# Patient Record
Sex: MALE | Race: WHITE | NOT HISPANIC OR LATINO | Employment: FULL TIME | URBAN - METROPOLITAN AREA
[De-identification: names, ages, dates, MRNs, and addresses within clinical notes are randomized per-mention and may not be internally consistent; named-entity substitution may affect disease eponyms.]

---

## 2023-10-19 ENCOUNTER — OFFICE VISIT (OUTPATIENT)
Dept: FAMILY MEDICINE CLINIC | Facility: CLINIC | Age: 28
End: 2023-10-19
Payer: COMMERCIAL

## 2023-10-19 VITALS
HEIGHT: 72 IN | BODY MASS INDEX: 28.31 KG/M2 | HEART RATE: 94 BPM | OXYGEN SATURATION: 97 % | WEIGHT: 209 LBS | TEMPERATURE: 98.7 F | DIASTOLIC BLOOD PRESSURE: 78 MMHG | SYSTOLIC BLOOD PRESSURE: 115 MMHG

## 2023-10-19 DIAGNOSIS — Z00.00 ROUTINE GENERAL MEDICAL EXAMINATION AT A HEALTH CARE FACILITY: Primary | ICD-10-CM

## 2023-10-19 NOTE — PROGRESS NOTES
"Sabina Aguilera is a 27 y.o. male presents for   Chief Complaint   Patient presents with    Hypertension     In past HTN       Health Maintenance Due   Topic Date Due    BMI FOLLOWUP  Never done    COVID-19 Vaccine (1) Never done    INFLUENZA VACCINE  Never done    HEPATITIS C SCREENING  Never done    ANNUAL PHYSICAL  Never done       History of Present Illness   Pt present to establish care.  He reports a history of htn and was on medication in the past.  He states he was taken off of medication due to bp normalizing and has remained normal since last year.  He denies problems or conerns today.   Discussed importance of balanced diet and routine exercise.  Also discussed risk and benefits of current recommended vaccines.  Patient declined vaccines today.  Vitals:    10/19/23 1523   BP: 115/78   BP Location: Right arm   Patient Position: Sitting   Cuff Size: Adult   Pulse: 94   Temp: 98.7 °F (37.1 °C)   TempSrc: Tympanic   SpO2: 97%   Weight: 94.8 kg (209 lb)   Height: 182.9 cm (72.01\")     Body mass index is 28.34 kg/m².    No current outpatient medications on file prior to visit.     No current facility-administered medications on file prior to visit.       The following portions of the patient's history were reviewed and updated as appropriate: allergies, current medications, past family history, past medical history, past social history, past surgical history, and problem list.    Review of Systems    Objective   Physical Exam  Vitals and nursing note reviewed.   Constitutional:       Appearance: Normal appearance. He is well-developed.   HENT:      Head: Normocephalic and atraumatic.      Right Ear: Tympanic membrane, ear canal and external ear normal.      Left Ear: Tympanic membrane, ear canal and external ear normal.      Nose: Nose normal.   Eyes:      Extraocular Movements: Extraocular movements intact.      Conjunctiva/sclera: Conjunctivae normal.      Pupils: Pupils are equal, round, and " reactive to light.   Cardiovascular:      Rate and Rhythm: Normal rate and regular rhythm.      Heart sounds: Normal heart sounds.   Pulmonary:      Effort: Pulmonary effort is normal.      Breath sounds: Normal breath sounds.   Abdominal:      General: Bowel sounds are normal.      Palpations: Abdomen is soft.   Musculoskeletal:         General: Normal range of motion.      Cervical back: Normal range of motion and neck supple.   Skin:     General: Skin is warm and dry.   Neurological:      General: No focal deficit present.      Mental Status: He is alert and oriented to person, place, and time.   Psychiatric:         Mood and Affect: Mood normal.         Behavior: Behavior normal.       PHQ-9 Total Score:      Assessment & Plan   Diagnoses and all orders for this visit:    1. Routine general medical examination at a health care facility (Primary)  Comments:  We will request previous records and labs.        There are no Patient Instructions on file for this visit.

## 2023-11-01 ENCOUNTER — TELEPHONE (OUTPATIENT)
Dept: FAMILY MEDICINE CLINIC | Facility: CLINIC | Age: 28
End: 2023-11-01
Payer: COMMERCIAL

## 2023-11-01 NOTE — TELEPHONE ENCOUNTER
CALLED PATIENT TO RESCHEDULE APPT ON 10/21/2023  PATIENT DID NOT ANSWER, LEFT A VM WITH A CALL BACK NUMBER

## 2023-11-30 ENCOUNTER — OFFICE VISIT (OUTPATIENT)
Dept: FAMILY MEDICINE CLINIC | Facility: CLINIC | Age: 28
End: 2023-11-30
Payer: COMMERCIAL

## 2023-11-30 VITALS
DIASTOLIC BLOOD PRESSURE: 73 MMHG | SYSTOLIC BLOOD PRESSURE: 114 MMHG | WEIGHT: 205 LBS | HEIGHT: 72 IN | OXYGEN SATURATION: 97 % | HEART RATE: 92 BPM | BODY MASS INDEX: 27.77 KG/M2 | TEMPERATURE: 98 F

## 2023-11-30 DIAGNOSIS — M25.362 INSTABILITY OF LEFT KNEE JOINT: Primary | ICD-10-CM

## 2023-11-30 PROCEDURE — 99213 OFFICE O/P EST LOW 20 MIN: CPT | Performed by: NURSE PRACTITIONER

## 2023-11-30 NOTE — PROGRESS NOTES
"Sabina Aguilera is a 28 y.o. male presents for   Chief Complaint   Patient presents with    Knee Pain     Left side. Heat, pain and swelling. Says he knelt down and when he got up he heard a loud pop.       Health Maintenance Due   Topic Date Due    COVID-19 Vaccine (1) Never done    INFLUENZA VACCINE  Never done    HEPATITIS C SCREENING  Never done       History of Present Illness   Pt present with left knee pain.  He states yesterday he bent down to put a plug in a tire, and when he stood up he felt something pop in his left knee and intense pain in knee and thigh.  He states it has been painful to walk or do anything since that time.  He states at times, he feels like his knee will give out or fold backwards when trying to walk.  He states when he got out of bed this morning his knee gave out on him when he stood up.  He states it is painful with most movements.  He took aleve this morning and states it didn't provide significant relief. 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  He states walking is painful.  He states he wore a copper brace last night, but did not wear while working today. He is unsure if it helped.     Vitals:    11/30/23 1356   BP: 114/73   BP Location: Right arm   Patient Position: Sitting   Cuff Size: Large Adult   Pulse: 92   Temp: 98 °F (36.7 °C)   TempSrc: Tympanic   SpO2: 97%   Weight: 93 kg (205 lb)   Height: 182.9 cm (72.01\")     Body mass index is 27.8 kg/m².    No current outpatient medications on file prior to visit.     No current facility-administered medications on file prior to visit.       The following portions of the patient's history were reviewed and updated as appropriate: allergies, current medications, past family history, past medical history, past social history, past surgical history, and problem list.    Review of Systems   Musculoskeletal:         Left knee pain "       Objective   Physical Exam  Vitals and nursing note reviewed.   Constitutional:       Appearance: Normal appearance. He is well-developed.   HENT:      Head: Normocephalic and atraumatic.      Right Ear: External ear normal.      Left Ear: External ear normal.      Nose: Nose normal.   Eyes:      Extraocular Movements: Extraocular movements intact.      Pupils: Pupils are equal, round, and reactive to light.   Cardiovascular:      Rate and Rhythm: Normal rate.      Pulses: Normal pulses.   Pulmonary:      Effort: Pulmonary effort is normal.   Musculoskeletal:      Cervical back: Normal range of motion and neck supple.      Left knee: Swelling, effusion and crepitus present. Decreased range of motion. Tenderness present over the medial joint line and lateral joint line.      Instability Tests: Anterior drawer test positive. Medial Charo test positive.   Skin:     General: Skin is warm and dry.   Neurological:      General: No focal deficit present.      Mental Status: He is alert and oriented to person, place, and time.   Psychiatric:         Mood and Affect: Mood normal.         Behavior: Behavior normal.       PHQ-9 Total Score:      Assessment & Plan   Diagnoses and all orders for this visit:    1. Instability of left knee joint (Primary)  Comments:  instructed on RICE therapy and wrapped with ace in office- sx improved with ace wrap.  encouraged nsaids prn with food  Orders:  -     MRI Knee Left Without Contrast; Future  -     Ambulatory Referral to Orthopedic Surgery  -     XR Knee 3 View Left; Future        There are no Patient Instructions on file for this visit.       Answers submitted by the patient for this visit:  Primary Reason for Visit (Submitted on 11/30/2023)  What is the primary reason for your visit?: Lower Extremity Injury  Lower Extremity Injury Questionnaire (Submitted on 11/30/2023)  Chief Complaint: Lower extremity pain  Incident occurred: 12 to 24 hours ago  Incident location: at  home  Injury mechanism: unknown  Pain location: left knee  Pain quality: aching, burning, shooting, stabbing  Pain - numeric: 6/10  Pain course: intermittent  tingling: Yes  inability to bear weight: Yes  loss of motion: Yes  Foreign body present: no foreign bodies

## 2023-12-01 ENCOUNTER — HOSPITAL ENCOUNTER (OUTPATIENT)
Dept: GENERAL RADIOLOGY | Facility: HOSPITAL | Age: 28
Discharge: HOME | End: 2023-12-01
Admitting: NURSE PRACTITIONER
Payer: COMMERCIAL

## 2023-12-01 DIAGNOSIS — M25.362 INSTABILITY OF LEFT KNEE JOINT: ICD-10-CM

## 2023-12-01 PROCEDURE — 73562 X-RAY EXAM OF KNEE 3: CPT

## 2023-12-15 ENCOUNTER — HOSPITAL ENCOUNTER (OUTPATIENT)
Dept: MRI IMAGING | Facility: HOSPITAL | Age: 28
Discharge: HOME | End: 2023-12-15
Admitting: NURSE PRACTITIONER
Payer: COMMERCIAL

## 2023-12-15 DIAGNOSIS — M25.362 INSTABILITY OF LEFT KNEE JOINT: ICD-10-CM

## 2023-12-15 PROCEDURE — 73721 MRI JNT OF LWR EXTRE W/O DYE: CPT

## 2024-01-17 ENCOUNTER — TELEPHONE (OUTPATIENT)
Dept: FAMILY MEDICINE CLINIC | Facility: CLINIC | Age: 29
End: 2024-01-17
Payer: COMMERCIAL

## 2024-01-17 NOTE — TELEPHONE ENCOUNTER
Caller: Royce Aguilera    Relationship to patient: Self    Best call back number: [phone]    Patient is needing: PATIENT RETURNED CALL WITH LIST OF PROVIDERS, IS SENDING INFORMATION THROUGH MY CHART MESSAGING.

## 2024-01-17 NOTE — TELEPHONE ENCOUNTER
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

## 2024-08-29 ENCOUNTER — OFFICE VISIT (OUTPATIENT)
Dept: FAMILY MEDICINE CLINIC | Facility: CLINIC | Age: 29
End: 2024-08-29

## 2024-08-29 VITALS
TEMPERATURE: 100 F | OXYGEN SATURATION: 97 % | HEIGHT: 72 IN | DIASTOLIC BLOOD PRESSURE: 78 MMHG | BODY MASS INDEX: 28.31 KG/M2 | WEIGHT: 209 LBS | HEART RATE: 77 BPM | SYSTOLIC BLOOD PRESSURE: 120 MMHG

## 2024-08-29 DIAGNOSIS — R11.10 VOMITING, UNSPECIFIED VOMITING TYPE, UNSPECIFIED WHETHER NAUSEA PRESENT: Primary | ICD-10-CM

## 2024-08-29 DIAGNOSIS — R19.7 DIARRHEA, UNSPECIFIED TYPE: ICD-10-CM

## 2024-08-29 LAB
EXPIRATION DATE: NORMAL
FLUAV AG UPPER RESP QL IA.RAPID: NOT DETECTED
FLUBV AG UPPER RESP QL IA.RAPID: NOT DETECTED
INTERNAL CONTROL: NORMAL
Lab: NORMAL
SARS-COV-2 AG UPPER RESP QL IA.RAPID: NOT DETECTED

## 2024-08-29 RX ORDER — ONDANSETRON 4 MG/1
4 TABLET, ORALLY DISINTEGRATING ORAL EVERY 8 HOURS PRN
Qty: 30 TABLET | Refills: 0 | Status: SHIPPED | OUTPATIENT
Start: 2024-08-29

## 2024-08-29 NOTE — LETTER
August 29, 2024     Patient: Royce Aguilera   YOB: 1995   Date of Visit: 8/29/2024       To Whom It May Concern:    It is my medical opinion that Royce Aguilera may return to work when fever free.  He has been ill since Monday 8/26.         Sincerely,        VIANNEY Chavez    CC: No Recipients

## 2024-08-29 NOTE — PROGRESS NOTES
Sabina Aguilera is a 28 y.o. male presents for   Chief Complaint   Patient presents with    Diarrhea     For 2 days    Vomiting     For 4 days    Headache       Health Maintenance Due   Topic Date Due    COVID-19 Vaccine (1 - 2023-24 season) Never done    HEPATITIS C SCREENING  Never done    INFLUENZA VACCINE  08/01/2024       History of Present Illness  The patient is a 28-year-old male who presents for evaluation of diarrhea, headache, and vomiting.    He began feeling unwell on Sunday evening, skipping dinner and going to bed early. Despite waking up still feeling ill, he attempted to go to work on Monday but had to leave due to frequent restroom visits. His condition worsened on Tuesday, with the addition of a severe headache, vomiting, and diarrhea. He also experienced intense stomach pain, which he localized to his lower ribs. He was unable to work on Wednesday due to these symptoms. His temperature this morning was 99.9 degrees at home.    Today, he has not vomited but continues to experience diarrhea and a mild headache. He also reports a sporadic cough. He took a COVID-19 test at home two days ago and another one today in the office. He has been using the restroom approximately 12 times a day, with his stool varying from watery to brown and loose. He reports no mucus or blood in his stool. He recalls feeling unwell after eating yogurt for lunch on Sunday. He mentions that three family members have tested positive for COVID-19, but he has not been in contact with them for a month. No one else in his household is experiencing similar symptoms.    He has vomited 7 to 8 times since Monday morning, with the last episode occurring last night. He has not taken any over-the-counter medications for his symptoms, but did take Tylenol for his headache, which he subsequently vomited up two hours later. He has not taken any Imodium.    Vitals:    08/29/24 1316   BP: 120/78   BP Location: Left arm   Patient  "Position: Sitting   Cuff Size: Large Adult   Pulse: 77   Temp: 100 °F (37.8 °C)   TempSrc: Tympanic   SpO2: 97%   Weight: 94.8 kg (209 lb)   Height: 182.9 cm (72.01\")     Body mass index is 28.34 kg/m².    Current Outpatient Medications on File Prior to Visit   Medication Sig Dispense Refill    diclofenac (VOLTAREN) 75 MG EC tablet Take 1 tablet by mouth 2 (Two) Times a Day.       No current facility-administered medications on file prior to visit.       The following portions of the patient's history were reviewed and updated as appropriate: allergies, current medications, past family history, past medical history, past social history, past surgical history, and problem list.    Review of Systems   Gastrointestinal:  Positive for abdominal pain, diarrhea and vomiting.   Neurological:  Positive for headache.       Objective   Physical Exam  Vitals and nursing note reviewed.   Constitutional:       Appearance: Normal appearance. He is well-developed.   HENT:      Head: Normocephalic and atraumatic.      Right Ear: External ear normal.      Left Ear: External ear normal.      Nose: Nose normal.   Eyes:      Extraocular Movements: Extraocular movements intact.      Pupils: Pupils are equal, round, and reactive to light.   Cardiovascular:      Rate and Rhythm: Normal rate and regular rhythm.      Heart sounds: Normal heart sounds.   Pulmonary:      Effort: Pulmonary effort is normal.      Breath sounds: Normal breath sounds.   Abdominal:      General: Bowel sounds are normal.      Palpations: Abdomen is soft.      Tenderness: There is abdominal tenderness (epigastric).   Musculoskeletal:         General: Normal range of motion.      Cervical back: Normal range of motion and neck supple.   Skin:     General: Skin is warm and dry.   Neurological:      General: No focal deficit present.      Mental Status: He is alert and oriented to person, place, and time.   Psychiatric:         Mood and Affect: Mood normal.         " Behavior: Behavior normal.              PHQ-9 Total Score:      Results      Assessment & Plan   Diagnoses and all orders for this visit:    1. Vomiting, unspecified vomiting type, unspecified whether nausea present (Primary)  -     POCT SARS-CoV-2 + Flu Antigen CHARI  -     ondansetron ODT (ZOFRAN-ODT) 4 MG disintegrating tablet; Place 1 tablet on the tongue Every 8 (Eight) Hours As Needed for Nausea or Vomiting.  Dispense: 30 tablet; Refill: 0    2. Diarrhea, unspecified type         1. Diarrhea.  The patient reports experiencing diarrhea since Sunday evening, with up to 12 episodes per day. The stool varies from watery to brown and loose. He was advised to avoid dairy products and consume bland, low-fat foods such as chicken and mashed potatoes. The BRAT diet (bananas, rice, applesauce, and toast) was recommended to help slow down gut motility and bulk up the stool. Adequate hydration was emphasized. No blood or mucus was noted in the stool. If symptoms persist, further evaluation will be considered.    2. Headache.  The patient has been experiencing a headache since Sunday evening, which has improved but is still present. He took ibuprofen, but it is unclear if it was effective due to subsequent vomiting. He was advised to continue using over-the-counter pain relief as needed, ensuring adequate hydration.    3. Vomiting.  The patient experienced vomiting multiple times from Monday morning until last night. He has not vomited today. He was advised to maintain hydration and follow the BRAT diet to help settle his stomach. If vomiting recurs, further evaluation will be considered.        There are no Patient Instructions on file for this visit.         Patient or patient representative verbalized consent for the use of Ambient Listening during the visit with  VIANNEY Chavez for chart documentation. 8/29/2024  13:37 EDT

## 2024-09-10 ENCOUNTER — OFFICE VISIT (OUTPATIENT)
Dept: FAMILY MEDICINE CLINIC | Facility: CLINIC | Age: 29
End: 2024-09-10

## 2024-09-10 VITALS
OXYGEN SATURATION: 98 % | BODY MASS INDEX: 27.39 KG/M2 | HEIGHT: 72 IN | TEMPERATURE: 98.7 F | HEART RATE: 63 BPM | SYSTOLIC BLOOD PRESSURE: 121 MMHG | DIASTOLIC BLOOD PRESSURE: 79 MMHG | WEIGHT: 202.2 LBS

## 2024-09-10 DIAGNOSIS — J06.9 UPPER RESPIRATORY TRACT INFECTION, UNSPECIFIED TYPE: Primary | ICD-10-CM

## 2024-09-10 DIAGNOSIS — J98.8 CONGESTION OF RESPIRATORY TRACT: ICD-10-CM

## 2024-09-10 PROCEDURE — 87428 SARSCOV & INF VIR A&B AG IA: CPT | Performed by: NURSE PRACTITIONER

## 2024-09-10 PROCEDURE — 99213 OFFICE O/P EST LOW 20 MIN: CPT | Performed by: NURSE PRACTITIONER

## 2024-09-10 RX ORDER — BROMPHENIRAMINE MALEATE, PSEUDOEPHEDRINE HYDROCHLORIDE, AND DEXTROMETHORPHAN HYDROBROMIDE 2; 30; 10 MG/5ML; MG/5ML; MG/5ML
5 SYRUP ORAL 4 TIMES DAILY PRN
Qty: 475 ML | Refills: 0 | Status: SHIPPED | OUTPATIENT
Start: 2024-09-10

## 2024-09-10 RX ORDER — AZITHROMYCIN 250 MG/1
TABLET, FILM COATED ORAL
Qty: 6 TABLET | Refills: 0 | Status: SHIPPED | OUTPATIENT
Start: 2024-09-10

## 2024-09-10 NOTE — PROGRESS NOTES
"Sabina Aguilera is a 28 y.o. male presents for   Chief Complaint   Patient presents with    URI     Since last Friday. Nothing he is taking OTC is helping    Cough       Health Maintenance Due   Topic Date Due    HEPATITIS C SCREENING  Never done    COVID-19 Vaccine (1 - 2023-24 season) Never done    INFLUENZA VACCINE  08/01/2024       History of Present Illness  The patient is a 28-year-old male who presents for evaluation of cough.    He has been experiencing severe congestion since Friday, accompanied by a persistent cough that produces greenish-dark phlegm. The cough occurs every 5 to 10 minutes, sometimes forceful and other times mild, similar to clearing his throat. He also reports fatigue, occasional fevers, and a sensation of extreme cold. Despite his household members being ill, his symptoms have not improved with over-the-counter medications such as NyQuil, DayQuil, and Mucinex.    He notes swollen lymph nodes and soreness in his neck, which is exacerbated by turning his head. He experiences wheezing, which triggers his cough, and shortness of breath, particularly during physical exertion such as mowing his yard.     He has requested a note for work due to his condition.    Vitals:    09/10/24 1140   BP: 121/79   BP Location: Left arm   Patient Position: Sitting   Cuff Size: Large Adult   Pulse: 63   Temp: 98.7 °F (37.1 °C)   TempSrc: Tympanic   SpO2: 98%   Weight: 91.7 kg (202 lb 3.2 oz)   Height: 182.9 cm (72.01\")     Body mass index is 27.42 kg/m².    Current Outpatient Medications on File Prior to Visit   Medication Sig Dispense Refill    [DISCONTINUED] diclofenac (VOLTAREN) 75 MG EC tablet Take 1 tablet by mouth 2 (Two) Times a Day. (Patient not taking: Reported on 9/10/2024)      [DISCONTINUED] ondansetron ODT (ZOFRAN-ODT) 4 MG disintegrating tablet Place 1 tablet on the tongue Every 8 (Eight) Hours As Needed for Nausea or Vomiting. (Patient not taking: Reported on 9/10/2024) 30 tablet 0 "     No current facility-administered medications on file prior to visit.       The following portions of the patient's history were reviewed and updated as appropriate: allergies, current medications, past family history, past medical history, past social history, past surgical history, and problem list.    Review of Systems   Constitutional:  Positive for fever.   HENT:  Positive for congestion.    Respiratory:  Positive for cough.        Objective   Physical Exam  Vitals and nursing note reviewed.   Constitutional:       Appearance: He is well-developed. He is ill-appearing.   HENT:      Head: Normocephalic and atraumatic.      Right Ear: Tympanic membrane, ear canal and external ear normal.      Left Ear: Tympanic membrane, ear canal and external ear normal.      Nose: Nose normal. Mucosal edema present.      Mouth/Throat:      Pharynx: Posterior oropharyngeal erythema present.   Eyes:      Extraocular Movements: Extraocular movements intact.      Pupils: Pupils are equal, round, and reactive to light.   Cardiovascular:      Rate and Rhythm: Normal rate and regular rhythm.      Heart sounds: Normal heart sounds.   Pulmonary:      Effort: Pulmonary effort is normal.      Breath sounds: Normal breath sounds.   Abdominal:      General: Bowel sounds are normal.      Palpations: Abdomen is soft.   Musculoskeletal:         General: Normal range of motion.      Cervical back: Normal range of motion and neck supple.   Lymphadenopathy:      Cervical: Cervical adenopathy present.   Skin:     General: Skin is warm and dry.   Neurological:      General: No focal deficit present.      Mental Status: He is alert and oriented to person, place, and time.   Psychiatric:         Mood and Affect: Mood normal.         Behavior: Behavior normal.          Tonsils are enlarged and swollen.    PHQ-9 Total Score:      Results  Laboratory Studies  Covid and flu tests were negative.    Assessment & Plan   Diagnoses and all orders for this  visit:    1. Upper respiratory tract infection, unspecified type (Primary)  -     azithromycin (Zithromax Z-Ga) 250 MG tablet; Take 2 tablets by mouth on day 1, then 1 tablet daily on days 2-5  Dispense: 6 tablet; Refill: 0  -     brompheniramine-pseudoephedrine-DM 30-2-10 MG/5ML syrup; Take 5 mL by mouth 4 (Four) Times a Day As Needed for Cough.  Dispense: 475 mL; Refill: 0    2. Congestion of respiratory tract  -     POCT SARS-CoV-2 + Flu Antigen CHARI         1. Upper Respiratory Infection.  He reports congestion, productive cough with green sputum, intermittent fever, headaches, and fatigue since Friday.  negative COVID-19 and influenza tests  A Z-Ga (azithromycin) was prescribed to address the respiratory illness and associated fever. He was advised to continue taking Mucinex to aid in mucus breakdown and expulsion.  A prescription for cough syrup was also provided to alleviate inflammation and suppress the cough. He was encouraged to maintain hydration and rest.      There are no Patient Instructions on file for this visit.         Patient or patient representative verbalized consent for the use of Ambient Listening during the visit with  VIANNEY Chavez for chart documentation. 9/10/2024  12:51 EDT

## 2024-09-10 NOTE — LETTER
September 10, 2024     Patient: Royce Aguilera   YOB: 1995   Date of Visit: 9/10/2024       To Whom It May Concern:    It is my medical opinion that Royce Aguilera may return to work in one day.            Sincerely,        VIANNEY Chavez    CC: No Recipients

## 2025-01-14 ENCOUNTER — OFFICE VISIT (OUTPATIENT)
Dept: FAMILY MEDICINE CLINIC | Facility: CLINIC | Age: 30
End: 2025-01-14

## 2025-01-14 ENCOUNTER — LAB (OUTPATIENT)
Dept: FAMILY MEDICINE CLINIC | Facility: CLINIC | Age: 30
End: 2025-01-14

## 2025-01-14 VITALS
HEART RATE: 69 BPM | OXYGEN SATURATION: 98 % | DIASTOLIC BLOOD PRESSURE: 75 MMHG | SYSTOLIC BLOOD PRESSURE: 118 MMHG | WEIGHT: 206.2 LBS | HEIGHT: 72 IN | TEMPERATURE: 98.2 F | BODY MASS INDEX: 27.93 KG/M2

## 2025-01-14 DIAGNOSIS — R11.2 NAUSEA AND VOMITING, UNSPECIFIED VOMITING TYPE: Primary | ICD-10-CM

## 2025-01-14 DIAGNOSIS — R10.11 RUQ PAIN: ICD-10-CM
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
## 2025-01-14 LAB
ALBUMIN SERPL-MCNC: 4.5 G/DL (ref 3.5–5.2)
ALBUMIN/GLOB SERPL: 1.2 G/DL
ALP SERPL-CCNC: 68 U/L (ref 39–117)
ALT SERPL W P-5'-P-CCNC: 32 U/L (ref 1–41)
ANION GAP SERPL CALCULATED.3IONS-SCNC: 9.4 MMOL/L (ref 5–15)
AST SERPL-CCNC: 26 U/L (ref 1–40)
BASOPHILS # BLD AUTO: 0.04 10*3/MM3 (ref 0–0.2)
BASOPHILS NFR BLD AUTO: 0.5 % (ref 0–1.5)
BILIRUB SERPL-MCNC: 0.3 MG/DL (ref 0–1.2)
BUN SERPL-MCNC: 12 MG/DL (ref 6–20)
BUN/CREAT SERPL: 14.8 (ref 7–25)
CALCIUM SPEC-SCNC: 9.4 MG/DL (ref 8.6–10.5)
CHLORIDE SERPL-SCNC: 102 MMOL/L (ref 98–107)
CO2 SERPL-SCNC: 26.6 MMOL/L (ref 22–29)
CREAT SERPL-MCNC: 0.81 MG/DL (ref 0.76–1.27)
DEPRECATED RDW RBC AUTO: 39.5 FL (ref 37–54)
EGFRCR SERPLBLD CKD-EPI 2021: 123.2 ML/MIN/1.73
EOSINOPHIL # BLD AUTO: 0.03 10*3/MM3 (ref 0–0.4)
EOSINOPHIL NFR BLD AUTO: 0.4 % (ref 0.3–6.2)
ERYTHROCYTE [DISTWIDTH] IN BLOOD BY AUTOMATED COUNT: 12.8 % (ref 12.3–15.4)
EXPIRATION DATE: NORMAL
FLUAV AG UPPER RESP QL IA.RAPID: NOT DETECTED
FLUBV AG UPPER RESP QL IA.RAPID: NOT DETECTED
GLOBULIN UR ELPH-MCNC: 3.8 GM/DL
GLUCOSE SERPL-MCNC: 90 MG/DL (ref 65–99)
HCT VFR BLD AUTO: 44.4 % (ref 37.5–51)
HGB BLD-MCNC: 15.2 G/DL (ref 13–17.7)
IMM GRANULOCYTES # BLD AUTO: 0.03 10*3/MM3 (ref 0–0.05)
IMM GRANULOCYTES NFR BLD AUTO: 0.4 % (ref 0–0.5)
INTERNAL CONTROL: NORMAL
LYMPHOCYTES # BLD AUTO: 2.19 10*3/MM3 (ref 0.7–3.1)
LYMPHOCYTES NFR BLD AUTO: 27.3 % (ref 19.6–45.3)
Lab: NORMAL
MCH RBC QN AUTO: 29.5 PG (ref 26.6–33)
MCHC RBC AUTO-ENTMCNC: 34.2 G/DL (ref 31.5–35.7)
MCV RBC AUTO: 86 FL (ref 79–97)
MONOCYTES # BLD AUTO: 0.49 10*3/MM3 (ref 0.1–0.9)
MONOCYTES NFR BLD AUTO: 6.1 % (ref 5–12)
NEUTROPHILS NFR BLD AUTO: 5.24 10*3/MM3 (ref 1.7–7)
NEUTROPHILS NFR BLD AUTO: 65.3 % (ref 42.7–76)
NRBC BLD AUTO-RTO: 0 /100 WBC (ref 0–0.2)
PLATELET # BLD AUTO: 345 10*3/MM3 (ref 140–450)
PMV BLD AUTO: 9 FL (ref 6–12)
POTASSIUM SERPL-SCNC: 4 MMOL/L (ref 3.5–5.2)
PROT SERPL-MCNC: 8.3 G/DL (ref 6–8.5)
RBC # BLD AUTO: 5.16 10*6/MM3 (ref 4.14–5.8)
SARS-COV-2 AG UPPER RESP QL IA.RAPID: NOT DETECTED
SODIUM SERPL-SCNC: 138 MMOL/L (ref 136–145)
WBC NRBC COR # BLD AUTO: 8.02 10*3/MM3 (ref 3.4–10.8)

## 2025-01-14 PROCEDURE — 85025 COMPLETE CBC W/AUTO DIFF WBC: CPT | Performed by: NURSE PRACTITIONER

## 2025-01-14 PROCEDURE — 36415 COLL VENOUS BLD VENIPUNCTURE: CPT | Performed by: NURSE PRACTITIONER

## 2025-01-14 PROCEDURE — 80053 COMPREHEN METABOLIC PANEL: CPT | Performed by: NURSE PRACTITIONER

## 2025-01-14 RX ORDER — ONDANSETRON 4 MG/1
4 TABLET, ORALLY DISINTEGRATING ORAL EVERY 8 HOURS PRN
Qty: 30 TABLET | Refills: 1 | Status: SHIPPED | OUTPATIENT
Start: 2025-01-14

## 2025-01-14 RX ORDER — DOXYCYCLINE 100 MG/1
100 CAPSULE ORAL 2 TIMES DAILY
Qty: 20 CAPSULE | Refills: 0 | Status: SHIPPED | OUTPATIENT
Start: 2025-01-14

## 2025-01-14 NOTE — LETTER
January 14, 2025     Patient: Royce Aguilera   YOB: 1995   Date of Visit: 1/14/2025       To Whom It May Concern:    It is my medical opinion that Royce Aguilera may return to work in one day.            Sincerely,        VIANNEY Chavez

## 2025-01-14 NOTE — PROGRESS NOTES
"Sabina Aguilera is a 29 y.o. male presents for   Chief Complaint   Patient presents with    Diarrhea    Vomiting     Since 2:45am this morning    Chills       Health Maintenance Due   Topic Date Due    HEPATITIS C SCREENING  Never done    ANNUAL PHYSICAL  10/19/2024    BMI FOLLOWUP  10/19/2024       History of Present Illness  The patient is a 29-year-old male who presents for evaluation of vomiting, diarrhea, and tick bite.    He has been experiencing episodes of vomiting since early this morning, with approximately 6 to 7 episodes occurring today. He also reports intermittent abdominal pain, which he attributes to the act of vomiting. Additionally, he has had several episodes of watery diarrhea. He does not report any fever or headache. His last meal consisted of smoked sausage and baked beans.    Approximately 2 weeks ago, he removed a tick from his leg. Currently, he observes a spot at the site of the tick bite, accompanied by peeling skin. He does not report any other rashes on his body. His partner has noticed small spots on his skin on a few occasions, but these are not currently present. The area around the tick bite is not itchy unless he scratches it.    Vitals:    01/14/25 1121   BP: 118/75   BP Location: Left arm   Patient Position: Sitting   Cuff Size: Large Adult   Pulse: 69   Temp: 98.2 °F (36.8 °C)   TempSrc: Tympanic   SpO2: 98%   Weight: 93.5 kg (206 lb 3.2 oz)   Height: 182.9 cm (72.01\")     Body mass index is 27.96 kg/m².    Current Outpatient Medications on File Prior to Visit   Medication Sig Dispense Refill    azithromycin (Zithromax Z-Ga) 250 MG tablet Take 2 tablets by mouth on day 1, then 1 tablet daily on days 2-5 (Patient not taking: Reported on 1/14/2025) 6 tablet 0    brompheniramine-pseudoephedrine-DM 30-2-10 MG/5ML syrup Take 5 mL by mouth 4 (Four) Times a Day As Needed for Cough. (Patient not taking: Reported on 1/14/2025) 475 mL 0     No current facility-administered " medications on file prior to visit.       The following portions of the patient's history were reviewed and updated as appropriate: allergies, current medications, past family history, past medical history, past social history, past surgical history, and problem list.    Review of Systems   Gastrointestinal:  Positive for diarrhea and vomiting.   Skin:         Tick bite left thigh       Objective   Physical Exam  Vitals and nursing note reviewed.   Constitutional:       Appearance: Normal appearance. He is well-developed.   HENT:      Head: Normocephalic and atraumatic.      Right Ear: External ear normal.      Left Ear: External ear normal.      Nose: Nose normal.   Eyes:      Extraocular Movements: Extraocular movements intact.      Pupils: Pupils are equal, round, and reactive to light.   Cardiovascular:      Rate and Rhythm: Normal rate and regular rhythm.      Heart sounds: Normal heart sounds.   Pulmonary:      Effort: Pulmonary effort is normal.      Breath sounds: Normal breath sounds.   Abdominal:      General: Bowel sounds are normal.      Palpations: Abdomen is soft.      Tenderness: There is abdominal tenderness (RUQ).   Musculoskeletal:         General: Normal range of motion.      Cervical back: Normal range of motion and neck supple.   Skin:     General: Skin is warm and dry.          Neurological:      General: No focal deficit present.      Mental Status: He is alert and oriented to person, place, and time.   Psychiatric:         Mood and Affect: Mood normal.         Behavior: Behavior normal.          Lungs were auscultated.  Abdominal exam was performed.    PHQ-9 Total Score:      Results  Laboratory Studies  COVID-19 and influenza tests were negative.    Assessment & Plan   Diagnoses and all orders for this visit:    1. Nausea and vomiting, unspecified vomiting type (Primary)  -     POCT SARS-CoV-2 + Flu Antigen CHARI  -     ondansetron ODT (ZOFRAN-ODT) 4 MG disintegrating tablet; Place 1 tablet on  the tongue Every 8 (Eight) Hours As Needed for Nausea or Vomiting.  Dispense: 30 tablet; Refill: 1    2. Tick bite, unspecified site, initial encounter  -     doxycycline (MONODOX) 100 MG capsule; Take 1 capsule by mouth 2 (Two) Times a Day.  Dispense: 20 capsule; Refill: 0    3. RUQ pain  -     CBC w AUTO Differential  -     Comprehensive metabolic panel         1. Gastrointestinal symptoms.  The etiology of the symptoms could be a gastrointestinal infection or an adverse reaction to a certain food item.  The vomiting is unlikely to be associated with the tick bite, although this can not be definitively ruled out. A prescription for Zofran 4 mg will be provided to manage the nausea and vomiting. He is advised to adhere to the BRAT diet (bananas, rice, applesauce, and toast) to help bind the stools and replenish lost electrolytes. Imodium can be used sparingly to manage the diarrhea, with caution against overuse to prevent constipation. He is encouraged to maintain adequate hydration and consume foods rich in electrolytes such as Pedialyte. Baseline labs will be ordered to assess cholesterol, kidney, and liver function. A CBC will be conducted to check for potential infection. He will be informed of the lab results tomorrow. If there is no improvement in symptoms or if they worsen, he should contact the clinic.    2. Tick bite.  The skin peeling around the tick bite area could be a local reaction to the tick. A prescription for doxycycline will be provided to treat the tick bite. Hydrocortisone cream can be applied to the affected area to manage any itching.    Follow-up  The patient is scheduled for a follow-up visit in 3 months for an annual physical examination.    Patient Instructions     Health Maintenance Due   Topic Date Due    HEPATITIS C SCREENING  Never done    INFLUENZA VACCINE  Never done    COVID-19 Vaccine (1 - 2024-25 season) Never done    ANNUAL PHYSICAL  10/19/2024    BMI FOLLOWUP  10/19/2024              Patient or patient representative verbalized consent for the use of Ambient Listening during the visit with  VIANNEY Chavez for chart documentation. 1/14/2025  16:22 EST

## 2025-02-19 ENCOUNTER — OFFICE VISIT (OUTPATIENT)
Dept: FAMILY MEDICINE CLINIC | Facility: CLINIC | Age: 30
End: 2025-02-19
Payer: COMMERCIAL

## 2025-02-19 VITALS
DIASTOLIC BLOOD PRESSURE: 79 MMHG | TEMPERATURE: 97.8 F | OXYGEN SATURATION: 96 % | SYSTOLIC BLOOD PRESSURE: 116 MMHG | WEIGHT: 206.2 LBS | BODY MASS INDEX: 27.93 KG/M2 | HEIGHT: 72 IN | HEART RATE: 87 BPM

## 2025-02-19 DIAGNOSIS — R19.7 DIARRHEA, UNSPECIFIED TYPE: Primary | ICD-10-CM

## 2025-02-19 PROCEDURE — 99213 OFFICE O/P EST LOW 20 MIN: CPT | Performed by: NURSE PRACTITIONER

## 2025-02-19 PROCEDURE — 87428 SARSCOV & INF VIR A&B AG IA: CPT | Performed by: NURSE PRACTITIONER

## 2025-02-19 NOTE — PROGRESS NOTES
"Sabina Aguilera is a 29 y.o. male presents for   Chief Complaint   Patient presents with    Headache     Daughter has Covid and flu right now    Leg Pain    Diarrhea    Abdominal Pain       Health Maintenance Due   Topic Date Due    HEPATITIS C SCREENING  Never done    ANNUAL PHYSICAL  10/19/2024    BMI FOLLOWUP  10/19/2024       History of Present Illness  The patient is a 29-year-old male who presents for evaluation of influenza-like symptoms.    He has been experiencing influenza-like symptoms since Friday, characterized by generalized body aches, particularly in his legs, hips, and ankles. He reports no shortness of breath or chest pain but does experience abdominal discomfort. His temperature was recorded as 98.7 this morning. He describes a sensation of impending vomiting, although he has not actually vomited. He does not report any nausea. He has been experiencing intermittent diarrhea for the past 2 days, with varying consistency. Additionally, he has had a persistent headache since Friday. He has not sought relief from over-the-counter medications. He was excused from work yesterday and today due to his illness. His daughter is currently diagnosed with COVID-19 and influenza, having recovered from strep throat a week prior. Concurrently, his youngest child was also suffering from influenza.    Vitals:    02/19/25 1326   BP: 116/79   BP Location: Right arm   Patient Position: Sitting   Cuff Size: Large Adult   Pulse: 87   Temp: 97.8 °F (36.6 °C)   TempSrc: Tympanic   SpO2: 96%   Weight: 93.5 kg (206 lb 3.2 oz)   Height: 182.9 cm (72.01\")     Body mass index is 27.96 kg/m².    Current Outpatient Medications on File Prior to Visit   Medication Sig Dispense Refill    ondansetron ODT (ZOFRAN-ODT) 4 MG disintegrating tablet Place 1 tablet on the tongue Every 8 (Eight) Hours As Needed for Nausea or Vomiting. 30 tablet 1    azithromycin (Zithromax Z-Ga) 250 MG tablet Take 2 tablets by mouth on day 1, then " 1 tablet daily on days 2-5 (Patient not taking: Reported on 2/19/2025) 6 tablet 0    brompheniramine-pseudoephedrine-DM 30-2-10 MG/5ML syrup Take 5 mL by mouth 4 (Four) Times a Day As Needed for Cough. (Patient not taking: Reported on 2/19/2025) 475 mL 0    doxycycline (MONODOX) 100 MG capsule Take 1 capsule by mouth 2 (Two) Times a Day. (Patient not taking: Reported on 2/19/2025) 20 capsule 0     No current facility-administered medications on file prior to visit.       The following portions of the patient's history were reviewed and updated as appropriate: allergies, current medications, past family history, past medical history, past social history, past surgical history, and problem list.    Review of Systems   Gastrointestinal:  Positive for diarrhea.   Musculoskeletal:  Positive for myalgias.   Neurological:  Positive for headache.       Objective   Physical Exam  Vitals and nursing note reviewed.   Constitutional:       Appearance: Normal appearance. He is well-developed.   HENT:      Head: Normocephalic and atraumatic.      Right Ear: External ear normal.      Left Ear: External ear normal.      Nose: Nose normal.   Eyes:      Extraocular Movements: Extraocular movements intact.      Pupils: Pupils are equal, round, and reactive to light.   Cardiovascular:      Rate and Rhythm: Normal rate and regular rhythm.      Heart sounds: Normal heart sounds.   Pulmonary:      Effort: Pulmonary effort is normal.      Breath sounds: Normal breath sounds.   Abdominal:      General: Bowel sounds are normal.      Palpations: Abdomen is soft.      Tenderness: There is abdominal tenderness (upper abdomen).   Musculoskeletal:         General: Normal range of motion.      Cervical back: Normal range of motion and neck supple.   Skin:     General: Skin is warm and dry.   Neurological:      General: No focal deficit present.      Mental Status: He is alert and oriented to person, place, and time.   Psychiatric:         Mood and  Affect: Mood normal.         Behavior: Behavior normal.          Oral exam was performed.  Lungs were auscultated.  Abdominal exam revealed mild tenderness on palpation.    Vital Signs  Temperature was 98.7 this morning.    PHQ-9 Total Score:      Results  Laboratory Studies  COVID-19 and influenza tests are negative.    Assessment & Plan   Diagnoses and all orders for this visit:    1. Diarrhea, unspecified type (Primary)  -     POCT SARS-CoV-2 + Flu Antigen CHARI         1. Viral gastroenteritis.  The patient's symptoms, including stomach pain, diarrhea, and headache, are consistent with a viral infection. COVID-19 and influenza tests returned negative results, and there are no signs of a bacterial infection upon examination. He is advised to consume bland foods and maintain adequate hydration. Over-the-counter medications such as Tylenol or ibuprofen can be used for symptom management. Rest is recommended. A work note has been provided, allowing him to return to work tomorrow unless he develops a fever or experiences worsening symptoms. If symptoms worsen, he should notify the clinic.    There are no Patient Instructions on file for this visit.         Patient or patient representative verbalized consent for the use of Ambient Listening during the visit with  VIANNEY Chavez for chart documentation. 2/19/2025  14:07 EST

## 2025-02-19 NOTE — LETTER
February 19, 2025     Patient: Royce Aguilera   YOB: 1995   Date of Visit: 2/19/2025       To Whom It May Concern:    It is my medical opinion that Royce Aguilera may return to work in one day.            Sincerely,        VIANNEY Chavez    CC: No Recipients

## 2025-02-19 NOTE — Clinical Note
February 19, 2025     Patient: Royce Aguilera   YOB: 1995   Date of Visit: 2/19/2025       To Whom it May Concern:    Royce Aguilera was seen in my clinic on 2/19/2025. He may return to school in two days.         Sincerely,          VIANNEY Chavez        CC: No Recipients

## 2025-05-01 ENCOUNTER — OFFICE VISIT (OUTPATIENT)
Dept: FAMILY MEDICINE CLINIC | Facility: CLINIC | Age: 30
End: 2025-05-01
Payer: COMMERCIAL

## 2025-05-01 VITALS
HEIGHT: 72 IN | BODY MASS INDEX: 28.79 KG/M2 | WEIGHT: 212.6 LBS | TEMPERATURE: 97.8 F | DIASTOLIC BLOOD PRESSURE: 69 MMHG | RESPIRATION RATE: 16 BRPM | SYSTOLIC BLOOD PRESSURE: 111 MMHG | HEART RATE: 81 BPM | OXYGEN SATURATION: 96 %

## 2025-05-01 DIAGNOSIS — Z00.00 ROUTINE GENERAL MEDICAL EXAMINATION AT A HEALTH CARE FACILITY: Primary | ICD-10-CM

## 2025-05-01 DIAGNOSIS — Z01.83 ENCOUNTER FOR BLOOD TYPING: ICD-10-CM

## 2025-05-01 DIAGNOSIS — M25.562 CHRONIC PAIN OF LEFT KNEE: ICD-10-CM

## 2025-05-01 DIAGNOSIS — J30.2 SEASONAL ALLERGIES: ICD-10-CM

## 2025-05-01 DIAGNOSIS — G89.29 CHRONIC PAIN OF LEFT KNEE: ICD-10-CM

## 2025-05-01 RX ORDER — DICLOFENAC SODIUM 75 MG/1
75 TABLET, DELAYED RELEASE ORAL 2 TIMES DAILY
Qty: 60 TABLET | Refills: 3 | Status: SHIPPED | OUTPATIENT
Start: 2025-05-01

## 2025-05-01 NOTE — PROGRESS NOTES
Sabina Aguilera is a 29 y.o. male presents for   Chief Complaint   Patient presents with    Annual Exam     Wants to discuss left knee pain       Health Maintenance Due   Topic Date Due    HEPATITIS C SCREENING  Never done    ANNUAL PHYSICAL  10/19/2024       History of Present Illness  The patient presents for an annual physical exam with complaints of severe knee pain and a recent episode of a prolonged nosebleed.    The chief complaint is severe knee pain that has been ongoing daily for the past few months. The pain is described as a constant pressure below the kneecap with a steady ache. He reports a sensation of his heartbeat in the knee when lying down. Diclofenac 75 mg has been used for pain management, with variable effectiveness. Aleve is taken intermittently for additional relief. A history of knee surgery performed by Dr. Lackey is noted, during which no tissue was excised due to pre-existing damage. Post-surgery, an injection provided significant relief for several months. The onset of current symptoms began with a popping sensation in the knee while squatting to plug a tire, followed by a fall the next day due to knee instability and swelling. An MRI conducted in 12/2023 revealed a large displaced left medial meniscus. Physical therapy was declined post-surgery. Knee pain has persisted for over 6 months.    A prolonged nosebleed lasting 5 hours occurred last week, requiring immediate care intervention. Extreme fatigue was reported on the day of the nosebleed, with difficulty getting out of bed. A sensation of blood rushing out of the nose upon blowing it was also noted. Over-the-counter allergy medication is taken occasionally for seasonal allergies. A history of recurrent nosebleeds during high school, managed with cauterization, is reported.    Diet includes occasional salads and daily apple consumption. Bloating and constipation have been experienced over the past few days.    PAST SURGICAL  "HISTORY:  - Knee surgery performed by Dr. Lackey in 02/2024.    Vitals:    05/01/25 1440   BP: 111/69   BP Location: Right arm   Patient Position: Sitting   Cuff Size: Large Adult   Pulse: 81   Resp: 16   Temp: 97.8 °F (36.6 °C)   SpO2: 96%   Weight: 96.4 kg (212 lb 9.6 oz)   Height: 182.9 cm (72\")     Body mass index is 28.83 kg/m².    Current Outpatient Medications on File Prior to Visit   Medication Sig Dispense Refill    [DISCONTINUED] azithromycin (Zithromax Z-Ga) 250 MG tablet Take 2 tablets by mouth on day 1, then 1 tablet daily on days 2-5 (Patient not taking: Reported on 1/14/2025) 6 tablet 0    [DISCONTINUED] brompheniramine-pseudoephedrine-DM 30-2-10 MG/5ML syrup Take 5 mL by mouth 4 (Four) Times a Day As Needed for Cough. (Patient not taking: Reported on 1/14/2025) 475 mL 0    [DISCONTINUED] doxycycline (MONODOX) 100 MG capsule Take 1 capsule by mouth 2 (Two) Times a Day. (Patient not taking: Reported on 2/19/2025) 20 capsule 0    [DISCONTINUED] ondansetron ODT (ZOFRAN-ODT) 4 MG disintegrating tablet Place 1 tablet on the tongue Every 8 (Eight) Hours As Needed for Nausea or Vomiting. 30 tablet 1     No current facility-administered medications on file prior to visit.       The following portions of the patient's history were reviewed and updated as appropriate: allergies, current medications, past family history, past medical history, past social history, past surgical history, and problem list.    Review of Systems   Musculoskeletal:         Left knee pain   Allergic/Immunologic: Positive for environmental allergies.       Objective   Physical Exam  Vitals and nursing note reviewed.   Constitutional:       Appearance: Normal appearance. He is well-developed.   HENT:      Head: Normocephalic and atraumatic.      Right Ear: External ear normal.      Left Ear: External ear normal. A middle ear effusion is present.      Nose: Mucosal edema and rhinorrhea present.      Mouth/Throat:      Pharynx: Postnasal " drip present.   Eyes:      Extraocular Movements: Extraocular movements intact.      Pupils: Pupils are equal, round, and reactive to light.   Cardiovascular:      Rate and Rhythm: Normal rate and regular rhythm.      Heart sounds: Normal heart sounds.   Pulmonary:      Effort: Pulmonary effort is normal.      Breath sounds: Normal breath sounds.   Abdominal:      General: Bowel sounds are normal.      Palpations: Abdomen is soft.   Musculoskeletal:         General: Normal range of motion.      Cervical back: Normal range of motion and neck supple.      Left knee: No swelling, bony tenderness or crepitus. Normal range of motion. Tenderness present over the patellar tendon.      Instability Tests: Anterior drawer test positive. Posterior drawer test negative. Medial Charo test negative and lateral Charo test negative.   Skin:     General: Skin is warm and dry.   Neurological:      General: No focal deficit present.      Mental Status: He is alert and oriented to person, place, and time.   Psychiatric:         Mood and Affect: Mood normal.         Behavior: Behavior normal.          Ears: Fluid in the ear, causing irritation.  Nose: Inflamed.  Mouth/Throat: Back of the throat is inflamed.  Gastrointestinal: Tenderness in the lower abdomen.  Musculoskeletal: Tenderness and pain in the left knee, consistent with patellar tendinitis.    PHQ-9 Total Score:      Results  Imaging   - MRI of the left knee: 12/2023, Large displaced left medial meniscus    Assessment & Plan   Diagnoses and all orders for this visit:    1. Routine general medical examination at a health care facility (Primary)  -     Lipid Panel  -     TSH    2. Chronic pain of left knee  -     diclofenac (VOLTAREN) 75 MG EC tablet; Take 1 tablet by mouth 2 (Two) Times a Day.  Dispense: 60 tablet; Refill: 3    3. Encounter for blood typing  -     ABO/Rh    4. Seasonal allergies         1. Patellar tendinitis.  - He has been experiencing knee pain for over  6 months. The symptoms suggest patellar tendinitis, likely due to a loss of strength around the knee following previous injury and surgery. This has resulted in decreased stability, causing the tendons to compensate more to maintain balance, leading to patellar tendinitis.  - A prescription for diclofenac 75 mg twice daily will be provided. He is advised against concurrent use of Aleve with diclofenac due to potential gastrointestinal and renal complications. Instead, Tylenol Arthritis can be used intermittently throughout the day.  - Information on patellar tendinitis and specific exercises will be provided. The use of a patellar tendon band during work is recommended, ensuring it is not too tight to avoid circulatory issues and leg swelling.  - Different insoles for boots may also be beneficial. If there is no improvement within 2 weeks, he will inform us so that a referral to orthopedics can be arranged for further evaluation.    2. Epistaxis.  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  - He is advised to take an over-the-counter allergy medication such as Zyrtec, Claritin, or Allegra daily.  - The use of a sinus rinse kit is also recommended. Instructions on how to use the kit will be provided.  - If the nosebleeds persist, further evaluation may be necessary.    3. Health maintenance.  - He is encouraged to maintain a diet rich in fruits and vegetables, lean protein sources, and to engage in regular exercise.  - Baseline lab work will be conducted to assess cholesterol and thyroid levels.  - Routine exercise and dietary modifications will be discussed to improve overall health.    Follow-up  - The patient will follow up in 1 year for his annual physical, or sooner if necessary.    Patient Instructions   Patellar Tendon Band         Patient or patient representative verbalized consent for the use of Ambient Listening during the  visit with  VIANNEY Chavez for chart documentation. 5/1/2025  15:21 EDT

## 2025-05-02 LAB
ABO GROUP BLD: NORMAL
CHOLEST SERPL-MCNC: 233 MG/DL (ref 100–199)
HDLC SERPL-MCNC: 49 MG/DL
LDLC SERPL CALC-MCNC: 152 MG/DL (ref 0–99)
RH BLD: POSITIVE
TRIGL SERPL-MCNC: 179 MG/DL (ref 0–149)
TSH SERPL DL<=0.005 MIU/L-ACNC: 1.01 UIU/ML (ref 0.45–4.5)
VLDLC SERPL CALC-MCNC: 32 MG/DL (ref 5–40)

## 2025-06-24 ENCOUNTER — OFFICE VISIT (OUTPATIENT)
Dept: FAMILY MEDICINE CLINIC | Facility: CLINIC | Age: 30
End: 2025-06-24
Payer: COMMERCIAL

## 2025-06-24 VITALS
BODY MASS INDEX: 29.42 KG/M2 | HEART RATE: 76 BPM | OXYGEN SATURATION: 97 % | DIASTOLIC BLOOD PRESSURE: 80 MMHG | WEIGHT: 217.2 LBS | HEIGHT: 72 IN | SYSTOLIC BLOOD PRESSURE: 130 MMHG | TEMPERATURE: 99.1 F

## 2025-06-24 DIAGNOSIS — R11.0 NAUSEA: ICD-10-CM

## 2025-06-24 DIAGNOSIS — R52 GENERALIZED BODY ACHES: ICD-10-CM

## 2025-06-24 DIAGNOSIS — J98.8 CONGESTION OF RESPIRATORY TRACT: Primary | ICD-10-CM

## 2025-06-24 DIAGNOSIS — R51.9 ACUTE INTRACTABLE HEADACHE, UNSPECIFIED HEADACHE TYPE: ICD-10-CM

## 2025-06-24 PROCEDURE — 99213 OFFICE O/P EST LOW 20 MIN: CPT

## 2025-06-24 PROCEDURE — 87428 SARSCOV & INF VIR A&B AG IA: CPT

## 2025-06-24 RX ORDER — ONDANSETRON 4 MG/1
4 TABLET, ORALLY DISINTEGRATING ORAL EVERY 8 HOURS PRN
Qty: 15 TABLET | Refills: 0 | Status: SHIPPED | OUTPATIENT
Start: 2025-06-24

## 2025-06-24 NOTE — PROGRESS NOTES
"Sabina Aguilera is a 29 y.o. male presents for   Chief Complaint   Patient presents with    Generalized Body Aches    Vomiting     Yesterday X2    Nasal Congestion     Started this morning    Headache     Since yesterday       Health Maintenance Due   Topic Date Due    HEPATITIS C SCREENING  Never done       History of Present Illness  The patient is a 29-year-old male who presents for evaluation of viral infection and skin lesions.    He reports experiencing a persistent headache since Monday morning, accompanied by body aches. He vomited twice yesterday but has not experienced any episodes today. He attended a birthday party on Saturday, where he was in close proximity to an individual exhibiting similar symptoms. He has been experiencing a low-grade fever, with temperatures not exceeding 99 degrees. He also mentions an episode of excessive sweating while at work yesterday. He does not report any cough or diarrhea. He has not taken any medications for these symptoms. He has attempted sinus rinses for his congestion but found them ineffective.    Additionally, he reports the presence of non-painful lesions on his left side of neck, below his ear and a couple on his left lower arm that have been present for approximately 2 years. These lesions tend to worsen with heat exposure. He has not sought dermatological consultation for this issue. He also reports the absence of any tick bites.       Vitals:    06/24/25 0907   BP: 130/80   BP Location: Left arm   Patient Position: Sitting   Cuff Size: Large Adult   Pulse: 76   Temp: 99.1 °F (37.3 °C)   TempSrc: Infrared   SpO2: 97%   Weight: 98.5 kg (217 lb 3.2 oz)   Height: 182.9 cm (72.01\")     Body mass index is 29.45 kg/m².    Current Outpatient Medications on File Prior to Visit   Medication Sig Dispense Refill    diclofenac (VOLTAREN) 75 MG EC tablet Take 1 tablet by mouth 2 (Two) Times a Day. 60 tablet 3     No current facility-administered medications on file " prior to visit.       The following portions of the patient's history were reviewed and updated as appropriate: allergies, current medications, past family history, past medical history, past social history, past surgical history, and problem list.    Review of Systems   Constitutional:  Positive for diaphoresis.   HENT:  Positive for congestion.    Gastrointestinal:  Positive for nausea and vomiting.   Neurological:  Positive for headache.       Objective   Physical Exam  Vitals and nursing note reviewed.   Constitutional:       Appearance: Normal appearance. He is well-developed.   HENT:      Head: Normocephalic and atraumatic.      Right Ear: Tympanic membrane, ear canal and external ear normal.      Left Ear: Tympanic membrane, ear canal and external ear normal.      Nose: Nose normal.      Right Sinus: No maxillary sinus tenderness or frontal sinus tenderness.      Left Sinus: No maxillary sinus tenderness or frontal sinus tenderness.   Eyes:      Extraocular Movements: Extraocular movements intact.      Pupils: Pupils are equal, round, and reactive to light.   Neck:     Cardiovascular:      Rate and Rhythm: Normal rate and regular rhythm.      Heart sounds: Normal heart sounds.   Pulmonary:      Effort: Pulmonary effort is normal.      Breath sounds: Normal breath sounds.   Abdominal:      General: Bowel sounds are normal.      Palpations: Abdomen is soft.   Musculoskeletal:         General: Normal range of motion.      Cervical back: Normal range of motion and neck supple.   Lymphadenopathy:      Cervical: No cervical adenopathy.   Skin:     General: Skin is warm and dry.   Neurological:      General: No focal deficit present.      Mental Status: He is alert and oriented to person, place, and time.   Psychiatric:         Mood and Affect: Mood normal.         Behavior: Behavior normal.          Neck: Presence of vascular lesions noted on the neck bilaterally.  Respiratory: Clear to auscultation, no wheezing,  rales or rhonchi.  Extremities: Vascular lesions noted on the left arm.  Skin: Vascular lesions noted on the neck and left arm.  PHQ-9 Total Score:      Results  Labs   - COVID-19 test: Negative   - Flu test: Negative       Assessment & Plan   Diagnoses and all orders for this visit:    1. Congestion of respiratory tract (Primary)    2. Generalized body aches  -     POCT SARS-CoV-2 + Flu Antigen CHARI    3. Nausea  -     ondansetron ODT (ZOFRAN-ODT) 4 MG disintegrating tablet; Place 1 tablet on the tongue Every 8 (Eight) Hours As Needed for Nausea.  Dispense: 15 tablet; Refill: 0    4. Acute intractable headache, unspecified headache type         1. Viral infection.  - Symptoms suggest a viral etiology, potentially norovirus, given the presence of both respiratory and gastrointestinal manifestations.  - COVID-19 and influenza tests returned negative results. The possibility of a sinus or bacterial infection was discussed, which typically requires a period of 5 to 7 days for development.  - Advised to manage symptoms with over-the-counter medications such as Mucinex, Claritin, Zyrtec, or Benadryl. For body aches and fever, Tylenol was recommended. Encouraged to maintain hydration and rest.  - Prescription for Zofran 4 mg disintegrating tablets provided to alleviate nausea and prevent vomiting. Medication sent to pharmacy. If condition does not improve by Thursday afternoon, contact us to schedule an appointment for Friday.    2. Skin lesions.  - Lesions appear vascular in nature and do not resemble any known tick-related illness.  - Photograph of the lesions taken for further evaluation by a dermatologist.  - Reassured that the lesions do not appear concerning at this time.  - If a tick bite is identified, inform us immediately so that a tick panel lab can be ordered and doxycycline prescribed.      There are no Patient Instructions on file for this visit.         Patient or patient representative verbalized consent  for the use of Ambient Listening during the visit with  VIANNEY Gilliland for chart documentation. 6/24/2025  10:27 EDT

## 2025-06-24 NOTE — LETTER
June 24, 2025     Patient: Royce Aguilera   YOB: 1995   Date of Visit: 6/24/2025       To Whom It May Concern:    It is my medical opinion that Royce Aguilera may return to work on 6/25/2025.           Sincerely,        VIANNEY Gilliland    CC: No Recipients

## 2025-08-20 ENCOUNTER — OFFICE VISIT (OUTPATIENT)
Dept: FAMILY MEDICINE CLINIC | Facility: CLINIC | Age: 30
End: 2025-08-20
Payer: COMMERCIAL

## 2025-08-20 VITALS
DIASTOLIC BLOOD PRESSURE: 87 MMHG | WEIGHT: 226.4 LBS | OXYGEN SATURATION: 96 % | SYSTOLIC BLOOD PRESSURE: 127 MMHG | HEIGHT: 72 IN | HEART RATE: 89 BPM | TEMPERATURE: 99.3 F | BODY MASS INDEX: 30.66 KG/M2

## 2025-08-20 DIAGNOSIS — R07.89 CHEST DISCOMFORT: ICD-10-CM

## 2025-08-20 DIAGNOSIS — J06.9 UPPER RESPIRATORY TRACT INFECTION, UNSPECIFIED TYPE: Primary | ICD-10-CM

## 2025-08-20 DIAGNOSIS — J02.9 SORE THROAT: ICD-10-CM

## 2025-08-20 DIAGNOSIS — R05.1 ACUTE COUGH: ICD-10-CM

## 2025-08-20 DIAGNOSIS — R51.9 NONINTRACTABLE HEADACHE, UNSPECIFIED CHRONICITY PATTERN, UNSPECIFIED HEADACHE TYPE: ICD-10-CM

## 2025-08-20 DIAGNOSIS — K21.9 GASTROESOPHAGEAL REFLUX DISEASE, UNSPECIFIED WHETHER ESOPHAGITIS PRESENT: ICD-10-CM

## 2025-08-20 DIAGNOSIS — R06.83 SNORES: ICD-10-CM

## 2025-08-20 LAB
EXPIRATION DATE: NORMAL
EXPIRATION DATE: NORMAL
FLUAV AG UPPER RESP QL IA.RAPID: NOT DETECTED
FLUBV AG UPPER RESP QL IA.RAPID: NOT DETECTED
INTERNAL CONTROL: NORMAL
INTERNAL CONTROL: NORMAL
Lab: NORMAL
Lab: NORMAL
S PYO AG THROAT QL: NEGATIVE
SARS-COV-2 AG UPPER RESP QL IA.RAPID: NOT DETECTED

## 2025-08-20 RX ORDER — BROMPHENIRAMINE MALEATE, PSEUDOEPHEDRINE HYDROCHLORIDE, AND DEXTROMETHORPHAN HYDROBROMIDE 2; 30; 10 MG/5ML; MG/5ML; MG/5ML
5 SYRUP ORAL 4 TIMES DAILY PRN
Qty: 118 ML | Refills: 0 | Status: SHIPPED | OUTPATIENT
Start: 2025-08-20

## 2025-08-20 RX ORDER — OMEPRAZOLE 20 MG/1
20 CAPSULE, DELAYED RELEASE ORAL DAILY
Qty: 30 CAPSULE | Refills: 6 | Status: SHIPPED | OUTPATIENT
Start: 2025-08-20

## 2025-08-26 ENCOUNTER — OFFICE VISIT (OUTPATIENT)
Dept: FAMILY MEDICINE CLINIC | Facility: CLINIC | Age: 30
End: 2025-08-26
Payer: COMMERCIAL

## 2025-08-26 VITALS
WEIGHT: 226.8 LBS | HEIGHT: 72 IN | DIASTOLIC BLOOD PRESSURE: 84 MMHG | OXYGEN SATURATION: 96 % | BODY MASS INDEX: 30.72 KG/M2 | SYSTOLIC BLOOD PRESSURE: 132 MMHG | TEMPERATURE: 99.3 F | HEART RATE: 70 BPM

## 2025-08-26 DIAGNOSIS — J01.01 ACUTE RECURRENT MAXILLARY SINUSITIS: Primary | ICD-10-CM

## 2025-08-26 DIAGNOSIS — R05.1 ACUTE COUGH: ICD-10-CM

## 2025-08-26 DIAGNOSIS — J02.9 SORE THROAT: ICD-10-CM

## 2025-08-26 DIAGNOSIS — R51.9 ACUTE INTRACTABLE HEADACHE, UNSPECIFIED HEADACHE TYPE: ICD-10-CM

## 2025-08-26 LAB
EXPIRATION DATE: NORMAL
INTERNAL CONTROL: NORMAL
Lab: NORMAL
S PYO AG THROAT QL: NEGATIVE

## 2025-08-26 PROCEDURE — 99213 OFFICE O/P EST LOW 20 MIN: CPT

## 2025-08-26 PROCEDURE — 87880 STREP A ASSAY W/OPTIC: CPT

## 2025-08-26 RX ORDER — CODEINE PHOSPHATE AND GUAIFENESIN 10; 100 MG/5ML; MG/5ML
5 SOLUTION ORAL 3 TIMES DAILY PRN
Qty: 118 ML | Refills: 0 | Status: SHIPPED | OUTPATIENT
Start: 2025-08-26